# Patient Record
Sex: FEMALE | Race: WHITE
[De-identification: names, ages, dates, MRNs, and addresses within clinical notes are randomized per-mention and may not be internally consistent; named-entity substitution may affect disease eponyms.]

---

## 2018-06-12 ENCOUNTER — HOSPITAL ENCOUNTER (OUTPATIENT)
Dept: HOSPITAL 97 - OR | Age: 63
Discharge: HOME | End: 2018-06-12
Attending: OBSTETRICS & GYNECOLOGY
Payer: COMMERCIAL

## 2018-06-12 DIAGNOSIS — R93.8: ICD-10-CM

## 2018-06-12 DIAGNOSIS — I10: ICD-10-CM

## 2018-06-12 DIAGNOSIS — E78.2: ICD-10-CM

## 2018-06-12 DIAGNOSIS — Z82.62: ICD-10-CM

## 2018-06-12 DIAGNOSIS — Z80.3: ICD-10-CM

## 2018-06-12 DIAGNOSIS — F17.210: ICD-10-CM

## 2018-06-12 DIAGNOSIS — D25.9: Primary | ICD-10-CM

## 2018-06-12 PROCEDURE — 0UDB7ZX EXTRACTION OF ENDOMETRIUM, VIA NATURAL OR ARTIFICIAL OPENING, DIAGNOSTIC: ICD-10-PCS

## 2018-06-12 PROCEDURE — 88305 TISSUE EXAM BY PATHOLOGIST: CPT

## 2018-06-12 PROCEDURE — 0UJD8ZZ INSPECTION OF UTERUS AND CERVIX, VIA NATURAL OR ARTIFICIAL OPENING ENDOSCOPIC: ICD-10-PCS

## 2018-06-12 RX ADMIN — MORPHINE SULFATE ONE MG: 4 INJECTION, SOLUTION INTRAMUSCULAR; INTRAVENOUS at 10:00

## 2018-06-12 RX ADMIN — MORPHINE SULFATE ONE MG: 4 INJECTION, SOLUTION INTRAMUSCULAR; INTRAVENOUS at 09:53

## 2018-06-12 NOTE — XMS REPORT
Clinical Summary

 Created on:2018



Patient:Noni Jeong

Sex:Female

:1955

External Reference #:[id number]





Demographics







 Address  [address] [place] [address]

 

 Home Phone  [phone]

 

 Email Address  [email or web address]

 

 Preferred Language  English

 

 Marital Status  Unknown

 

 Scientologist Affiliation  Unknown

 

 Race  Unknown

 

 Ethnic Group  Unknown









Author







 Organization  [facility]

 

 Address  [address] [place] [address]









Support







 Name  Relationship  Address  Phone

 

 Noni Jeong  Unavailable  [address]  [phone]



     [place] [address]  









Care Team Providers







 Name  Role  Phone

 

 Asked, No Pcp  Primary Care Provider  Unavailable









Allergies

Not on File



Current Medications

No known medications



Active Problems







 Problem  Noted Date

 

 Primary osteoarthritis of right wrist  2016







Family History







 Medical History  Relation  Name  Comments

 

 Alcohol abuse  Father    

 

 Hypertension  Father    

 

 Arthritis  Mother    









 Relation  Name  Status  Comments

 

 Father      

 

 Mother      







Social History







 Tobacco Use  Types  Packs/Day  Years Used  Date

 

 Current Every Day Smoker  Cigarettes  1.5    









 Alcohol Use  Drinks/Week  oz/Week  Comments

 

 Yes  1-2 Standard drinks or equivalent  0.6 - 1.2  









 Sex Assigned at Birth  Date Recorded

 

 Not on file  







Last Filed Vital Signs

Not on file



Plan of Treatment







 Health Maintenance  Due Date  Last Done  Comments

 

 CERVICAL CANCER SCREENING  1978    

 

 BREAST CANCER SCREENING  2007    

 

 COLON CANCER SCREENING  2007    

 

 SHINGRIX VACCINE (#1)  2007    

 

 ZOSTER VACCINE  2017    

 

 INFLUENZA VACCINE  2018    







Results

Not on fileafter 2017



Insurance







 Payer  Benefit Plan / Group  Subscriber ID  Type  Phone  Address

 

 AETNA  AETNA PPO OPEN CHOICE  xxxxxxxxxx  PPO    









 Guarantor Name  Account Type  Relation to  Date of  Phone  Billing



     Patient  Birth    Address

 

 NONI JEONG  Personal/Family  Self  1955  Home:  [address] 







 [place]        [phone]  [place] [phone]

## 2018-07-09 NOTE — OP
Date of Procedure:  06/12/2018



Surgeon:  Danyell Francois MD



Preoperative Diagnosis:  Enlarging fibroid, thickened endometrium.



Postoperative Diagnosis:  Enlarging fibroid, thickened endometrium.



Procedure:  Hysteroscopy, dilation and curettage, and dissection of the endometrium with __________



Anesthesia:  General.



Complications:  No complications.



Drains:  No drains.



Estimated Blood Loss:  Minimal.



Specimens:  Endometrial curettings with a resection device.



Condition:  Stable.



Indications:  The patient is a 62-year-old with an enlarging fibroids on ultrasound, thickened endome
trium, needed resection of the endometrium for adequate sampling and so to rule out any endometrial a
typia or cancer, possibly also a biopsy of the myometrium.  She was properly consented and brought to
 the OR.  After consent was re-verified in the preop, she was taken back to the OR, placed in a supin
e fashion on the operating table.  After general with LMA was given, she was placed in dorsal lithoto
my position using Gregorio stirrups.  Pelvic exam was performed, confirmed enlarged uterus.  Speculum wa
s placed to expose the cervix, anterior lip was grasped with 2 Allis clamps.  Prep x3 with Betadine w
as done.  Using the hysteroscope that comes with __________ device hysteroscopy was performed.  Hyste
roscopy was performed through __________ sheath and normal saline was used for distention medium, a 0
 degree lens was used for this. 



Then after checking the inner lining of the uterus, both tubal ostia were visualized.  The entire cav
ity was visualized.  The cavity appeared to be impinged by the fibroid; however, no intracavitary les
ions were seen.  The endometrium appeared to be even and no irregularity was seen.  The resection dev
ice was inserted through this.  Then using the suction and the cutting, 360 degrees endometrial resec
tion, resectoscopic biopsy was taken by scraping of the lining from the fundus all the way to the int
ernal os on all sides.  After adequate sampling was done, there was excellent hemostasis.  The bipola
r at the tip of it was used to cauterize one area that was bleeding, but otherwise adequate sample wa
s obtained.  The patient was hemostatic after the scope was removed.  All instruments were removed.  
Instrument, needle, and sponge counts were done and were correct at the end of the case.  The patient
 tolerated the procedure well.  She was recovered from anesthesia and taken to the PACU in stable con
dition.





HORTENSIA

DD:  07/09/2018 08:42:30Voice ID:  [id number]

DT:  07/09/2018 19:13:21Report ID:  [id number]

## 2018-07-13 NOTE — OP
Date of Procedure:  06/12/2018



Surgeon:  Danyell Francois MD



Preoperative Diagnosis:  Enlarging fibroid, thickened endometrium.



Postoperative Diagnosis:  Enlarging fibroid, thickened endometrium.



Procedure:  Hysteroscopy, dilation and curettage, and dissection of the endometrium with Symphion.



Anesthesia:  General.



Complications:  No complications.



Drains:  No drains.



Estimated Blood Loss:  Minimal.



Specimens:  Endometrial curettings with a resection device.



Condition:  Stable.



Indications:  The patient is a 62-year-old with an enlarging fibroids on ultrasound, thickened endome
trium, needed resection of the endometrium for adequate sampling and so to rule out any endometrial a
typia or cancer, possibly also a biopsy of the myometrium.  She was properly consented and brought to
 the OR.  After consent was re-verified in the preop, she was taken back to the OR, placed in a supin
e fashion on the operating table.  After general with LMA was given, she was placed in dorsal lithoto
my position using Gregorio stirrups.  Pelvic exam was performed, confirmed enlarged uterus.  Speculum wa
s placed to expose the cervix, anterior lip was grasped with 2 Allis clamps.  Prep x3 with Betadine w
as done.  Using the hysteroscope that comes with Symphion device, hysteroscopy was performed.  Hyster
oscopy was performed through the Symphion sheath and normal saline was used for distention medium, a 
0 degree lens was used for this. 



Then after checking the inner lining of the uterus, both tubal ostia were visualized.  The entire cav
ity was visualized.  The cavity appeared to be impinged by the fibroid; however, no intracavitary les
ions were seen.  The endometrium appeared to be even and no irregularity was seen.  The resection dev
ice was inserted through this.  Then using the suction and the cutting, 360 degrees endometrial resec
tion, resectoscopic biopsy was taken by scraping of the lining from the fundus all the way to the int
ernal os on all sides.  After adequate sampling was done, there was excellent hemostasis.  The bipola
r at the tip of it was used to cauterize one area that was bleeding, but otherwise adequate sample wa
s obtained.  The patient was hemostatic after the scope was removed.  All instruments were removed.  
Instrument, needle, and sponge counts were done and were correct at the end of the case.  The patient
 tolerated the procedure well.  She was recovered from anesthesia and taken to the PACU in stable con
dition.





HORTENSIA

DD:  07/09/2018 08:42:30Voice ID:  [id number]

DT:  07/09/2018 19:13:21Report ID:  [id number]

## 2018-12-03 LAB
HCT VFR BLD CALC: 45 % (ref 36–45)
LYMPHOCYTES # SPEC AUTO: 2.1 K/UL (ref 0.7–4.9)
MCH RBC QN AUTO: 32 PG (ref 27–35)
MCV RBC: 93.9 FL (ref 80–100)
PMV BLD: 8.3 FL (ref 7.6–11.3)
RBC # BLD: 4.8 M/UL (ref 3.86–4.86)
UA DIPSTICK W REFLEX MICRO PNL UR: (no result)

## 2018-12-06 ENCOUNTER — HOSPITAL ENCOUNTER (OUTPATIENT)
Dept: HOSPITAL 97 - PRE | Age: 63
Discharge: HOME | End: 2018-12-06
Attending: OBSTETRICS & GYNECOLOGY
Payer: COMMERCIAL

## 2018-12-06 DIAGNOSIS — E55.9: ICD-10-CM

## 2018-12-06 DIAGNOSIS — Z79.899: ICD-10-CM

## 2018-12-06 DIAGNOSIS — F17.210: ICD-10-CM

## 2018-12-06 DIAGNOSIS — D25.9: Primary | ICD-10-CM

## 2018-12-06 DIAGNOSIS — F32.9: ICD-10-CM

## 2018-12-06 DIAGNOSIS — N85.8: ICD-10-CM

## 2018-12-06 DIAGNOSIS — E78.5: ICD-10-CM

## 2018-12-06 DIAGNOSIS — N83.201: ICD-10-CM

## 2018-12-06 DIAGNOSIS — M19.90: ICD-10-CM

## 2018-12-06 DIAGNOSIS — I10: ICD-10-CM

## 2018-12-06 DIAGNOSIS — N73.9: ICD-10-CM

## 2018-12-06 DIAGNOSIS — N83.202: ICD-10-CM

## 2018-12-06 DIAGNOSIS — K66.0: ICD-10-CM

## 2018-12-06 PROCEDURE — 85025 COMPLETE CBC W/AUTO DIFF WBC: CPT

## 2018-12-06 PROCEDURE — 88108 CYTOPATH CONCENTRATE TECH: CPT

## 2018-12-06 PROCEDURE — 36415 COLL VENOUS BLD VENIPUNCTURE: CPT

## 2018-12-06 PROCEDURE — 0UTC4ZZ RESECTION OF CERVIX, PERCUTANEOUS ENDOSCOPIC APPROACH: ICD-10-PCS

## 2018-12-06 PROCEDURE — 81003 URINALYSIS AUTO W/O SCOPE: CPT

## 2018-12-06 PROCEDURE — 86850 RBC ANTIBODY SCREEN: CPT

## 2018-12-06 PROCEDURE — 0UT7FZZ RESECTION OF BILATERAL FALLOPIAN TUBES, VIA NATURAL OR ARTIFICIAL OPENING WITH PERCUTANEOUS ENDOSCOPIC ASSISTANCE: ICD-10-PCS

## 2018-12-06 PROCEDURE — 0UT9FZZ RESECTION OF UTERUS, VIA NATURAL OR ARTIFICIAL OPENING WITH PERCUTANEOUS ENDOSCOPIC ASSISTANCE: ICD-10-PCS

## 2018-12-06 PROCEDURE — 0UT2FZZ RESECTION OF BILATERAL OVARIES, VIA NATURAL OR ARTIFICIAL OPENING WITH PERCUTANEOUS ENDOSCOPIC ASSISTANCE: ICD-10-PCS

## 2018-12-06 PROCEDURE — 88305 TISSUE EXAM BY PATHOLOGIST: CPT

## 2018-12-06 PROCEDURE — 88307 TISSUE EXAM BY PATHOLOGIST: CPT

## 2018-12-06 PROCEDURE — 86901 BLOOD TYPING SEROLOGIC RH(D): CPT

## 2018-12-06 PROCEDURE — 86900 BLOOD TYPING SEROLOGIC ABO: CPT

## 2018-12-06 RX ADMIN — MEPERIDINE HYDROCHLORIDE ONE MG: 50 INJECTION, SOLUTION INTRAMUSCULAR; INTRAVENOUS; SUBCUTANEOUS at 12:00

## 2018-12-06 RX ADMIN — MEPERIDINE HYDROCHLORIDE ONE MG: 50 INJECTION, SOLUTION INTRAMUSCULAR; INTRAVENOUS; SUBCUTANEOUS at 11:55

## 2018-12-06 RX ADMIN — MEPERIDINE HYDROCHLORIDE ONE MG: 50 INJECTION, SOLUTION INTRAMUSCULAR; INTRAVENOUS; SUBCUTANEOUS at 11:40

## 2018-12-06 RX ADMIN — SODIUM CHLORIDE ONE MLS: 0.9 INJECTION, SOLUTION INTRAVENOUS at 10:14

## 2018-12-06 RX ADMIN — SODIUM CHLORIDE ONE MLS: 0.9 INJECTION, SOLUTION INTRAVENOUS at 10:57

## 2018-12-06 RX ADMIN — MEPERIDINE HYDROCHLORIDE ONE MG: 50 INJECTION, SOLUTION INTRAMUSCULAR; INTRAVENOUS; SUBCUTANEOUS at 11:50

## 2018-12-08 NOTE — OP
Date of Procedure:  2018



Surgeon:  Danyell Francois MD



Assistant:  Chacha Amato.



Preoperative Diagnoses:  Leiomyoma, right lower quadrant pain, and bloating.



Postoperative Diagnoses:  Leiomyoma, right lower quadrant pain, and bloating, adhesions of the bladde
r and cecal adhesions.



Procedures Performed:  

1.Total laparoscopic hysterectomy, bilateral salpingo-oophorectomy, pelvic washings.

2.Removal of the specimen vaginally without a bag or morcellation and cystoscopy.



Estimated Blood Loss:  Minimal.



Specimens:  Uterus, bilateral tubes and ovaries, pelvic washings.



Complications:  No complications.



Drains:  No drains.



Condition:  Stable.



Findings:  There were cecal adhesions in the right lateral aspect of the umbilicus.  Upper abdominal 
surfaces, peritoneal surfaces and omentum all completely unremarkable.  The appendix appeared to be u
nremarkable as well.  The uterus had a small tumor on the anterior wall close to the fundus that was 
about 2.5 cm and a large posterior mass that appeared to be vascular and about 7-8 cm or maybe 8-9 cm
.  There were small fibroids along the bilateral round ligaments and there were moderate amount of ad
hesions at the level of her  scar.  Both ovaries were normal.  Cystoscopy negative.



Indications:  The patient is a 63-year-old who had pelvic mass that was evaluated with ultrasound cally
ples and no major change in the size over time.  However, on sampling, there were endometrial glands 
and stroma, smooth muscle suggestive of adenomyosis.  No evidence of any leiomyosarcoma or endometria
l atypia or malignancy.  So, discussed about the patient's symptoms as she was having right lower christina
drant pain.  She was concerned about the uterine mass.  We discussed together about the options of ob
servation with surveillance versus hysterectomy and bilateral salpingo-oophorectomy, this possibly be
ing a leiomyoma, leiomyosarcoma risk being very, very low, still present.  However, in the absence of
 postmenopausal bleeding, it is not a big concern, however, patient was concerned enough that due to 
the presence of the pain and the presence of the mass, she was uncomfortable to continue observation 
with ultrasound surveillance, so consented her for total laparoscopic hysterectomy, bilateral salping
o-oophorectomy, pelvic washings, and brought to the OR.



Description Of Procedure:  After 1 g of Ancef was given, she was taken to the OR.  SCDs were placed, 
placed in supine fashion on the operating table.  After general anesthesia was given, she was placed 
in dorsal lithotomy position using Gregorio stirrups.  Pelvic exam was performed.  Uterus was found to b
e enlarged with a posterior extension of the mass as was indicated on the exam. 



Abdomen, vulva, vagina, and perineum were prepped and draped in a sterile fashion.  Daniel was placed 
to drain the bladder and attached to cysto tubing for retrograde filling to an LR bag, emptied 300.  
A large VCare was inserted into the uterus and fixed in place. 



On the abdomen, an infraumbilical incision was made with a scalpel.  Fascia was incised, tagged.  Per
itoneum entered bluntly, S-retractors placed, Ethan introduced.  Site of entry was checked and unrem
arkable.  Upper abdominal surfaces, omentum, peritoneal surfaces were all unremarkable.  Appendix ashok
eared to be normal.  On the right lateral aspect of the umbilicus was a large cecal adhesion to the a
nterior abdominal wall.  The right lower quadrant, left lower quadrant, and suprapubic areas were all
 free.  5 mm left lower and right lower quadrant incisions and 10 mm suprapubic incisions were made a
nd trocars placed under direct vision without any problems. 



After patient was placed in steep Trendelenburg position, the bowel was packed up top, uterus was vis
ualized.  Both ureters were visualized from the pelvic brim to the ureteric tunnel and they were with
out any anatomic distortion. 



A 5 mm LigaSure was taken.  Before this, pelvic washings were obtained, making sure that the myoma an
teriorly and posteriorly were both evinced and specimen collected from adnexa as well in the abdomina
l cavity and the upper aspect as well. 



I went on to start the hysterectomy with using the 5 mm LigaSure, taking down the peritoneum below th
e level of the round ligament, opening the flap up, getting to the bladder flap.  Here, the bladder a
dhesions were well visualized and dissection was performed just underneath the peritoneum and bladder
 flap was raised all the way to the right round ligament.  The bladder was dissected at the level of 
the cup from the anterior vaginal wall,  the bladder with dissection with the LigaSure and 
inferiorly dissecting it, then opening up the vesicovaginal space with a monopolar hook blade.  The b
ladder was dissected inferiorly with the peanut as well as LigaSure.  Once below the level of the VCa
re cup, I went back to the round ligaments.  The round ligaments, mesosalpinx, utero-ovarian ligament
s were all taken down and the tube and the posterior broad ligament was dissected to the left uterosa
cral.  The broad ligament was taken down to skeletonize the vessels.  On the opposite side, similar d
issection was performed taking down the round ligament, then the mesosalpinx tube, utero-ovarian liga
ment, and posterior broad ligament all the way to the right uterosacral ligament.  The broad ligament
 was taken down.  The vessels were skeletonized.  Then, the dissection on this side was slightly more
 difficult due to the presence of the fibroid.  However, once I came down below the level of the fibr
oid, there were no problems, did not have to cut the fiber or touch it during the entire dissection a
nd once the vessels were exposed, a medial incision was made with the monopolar hook blade, then the 
vessels were taken down with the help of the bipolar basket tip followed by the LigaSure.  Then, the 
cardinal ligaments were also taken down on the right side, then going on to left side taking down the
 vessels and descending vaginal branch and the cardinal ligaments.  Once all the supports were taken 
down, a circumferential colpotomy was performed with a monopolar hook blade and the specimen was sepa
rated.  Then, I went down inferiorly and with the help of the Allis clamps, the cervix was grasped.  
The uterus was curetted out through the vagina.  Then, the fibroid was manipulated so that it was twi
sted and turned, it allowed the passage through the colpotomy of the fibroid.  There was some distent
ion of the vaginal canal for it to be fit, but once I was able to slip it out, the entire specimen ca
me out.  There was slight separation of the myoma from the uterus with just traction applied on the s
pecimen to pull it out, but did not have to grasp it other than with a single-tooth tenaculum after I
 visualized it at the level of the vagina and it was removed gently without getting a spill from the 
gas in the abdominal cavity through the vaginal canal. 



Vaginal occluder bulb was placed.  Thorough irrigation and suction were performed in the abdominal ca
vity.  Then, tubes and ovaries were taken down, taken down infundibulopelvic ligament, broad ligament
, and the mesosalpinx, completely taking out the tube on both sides, first on the left, then on the r
ight.  I was able to make sure that all the tumors were removed from the round ligament as well.  The
n, at the level of the bladder flap, the irrigation and suction were performed.  All the specimens we
re placed in an Endo Catch bag placed through the vagina and retrieved without any problems.  Then, w
ent on to close the vaginal cuff with the help of 0 PDS 2 angle stitches and 2 figure-of-eight from t
he top and then at center, simple stitch. 



Cystoscopy was performed to make sure there was no bladder injury before the closure was done.  First
, the trocars were all removed after suction and irrigation were done in the peritoneal cavity.  Uret
ers had no evidence of electrical, mechanical, or thermal injury.  The gas was desufflated and trocar
s removed.  Fascia was closed at the umbilicus with a figure-of-eight and then simple 0 Vicryl stitch
 at the suprapubic fascial incision.  All skin incisions were closed with the help of 4-0 Monocryl in
terrupted sutures.  Once cystoscopy was performed, there was no evidence of any trauma to the bladder
.  No tumors in the bladder.  The entire bladder was examined carefully due to the history of smoking
 for this patient and lower urinary tract symptoms and urgency.  Ureters were patent as well.  The bl
adder was drained and scope removed.  On examining the vaginal cuff here with the help of scope doing
 a vaginoscopy, there was an opening in the center of the cuff that allowed peritoneal fluid to leak 
out.  At this point, I thought that this would be better to be repaired, this was because of the spac
e between the 2 figure-of-eight where I put a simple stitch, so posterior colpotomy was more exposed 
and not completely closed.  So, a 0 Vicryl suture was taken on a CT-1 needle and carefully the poster
ior and anterior colpotomy sutures were taken through them.  Two figure-of-eight were placed and comp
lete closure was obtained.  There was excellent hemostasis.  No concern for any bowel injury.  All th
e instruments were removed.  Instrument, needle, and sponge counts were done and were correct at the 
end of the case.  The patient tolerated the procedure well.  She was extubated in the OR 

and taken to PACU in stable condition.  She will follow up with me in 1 week.





HORTENSIA

DD:  2018 20:38:36Voice ID:  [id number]

DT:  2018 02:33:05Report ID:  [id number]

## 2020-02-24 NOTE — XMS REPORT
Clinical Summary

 Created on:2018



Patient:Anh Scott

Sex:Female

:1955

External Reference #:[id number]





Demographics







 Address  [address] [place] [address]

 

 Home Phone  [phone]

 

 Email Address  [email or web address]

 

 Preferred Language  English

 

 Marital Status  Unknown

 

 Restorationism Affiliation  Unknown

 

 Race  Unknown

 

 Ethnic Group  Unknown









Author







 Organization  [facility]

 

 Address  [address] [place] [address]









Support







 Name  Relationship  Address  Phone

 

 Anh Scott  Unavailable  [address]  [phone]



     [place] [address]  









Care Team Providers







 Name  Role  Phone

 

 Asked, No Pcp  Primary Care Provider  Unavailable









Allergies

Not on File



Medications

No known medications



Active Problems







 Problem  Noted Date

 

 Primary osteoarthritis of right wrist  2016







Family History







 Medical History  Relation  Name  Comments

 

 Alcohol abuse  Father    

 

 Hypertension  Father    

 

 Arthritis  Mother    









 Relation  Name  Status  Comments

 

 Father      

 

 Mother      







Social History







 Tobacco Use  Types  Packs/Day  Years Used  Date

 

 Current Every Day Smoker  Cigarettes  1.5    









 Alcohol Use  Drinks/Week  oz/Week  Comments

 

 Yes  1-2 Standard drinks or equivalent  0.6 - 1.2  









 Sex Assigned at Birth  Date Recorded

 

 Not on file  









 Job Start Date  Occupation  Industry

 

 Not on file  Not on file  Not on file









 Travel History  Travel Start  Travel End









 No recent travel history available.







Last Filed Vital Signs

Not on file



Plan of Treatment







 Health Maintenance  Due Date  Last Done  Comments

 

 CERVICAL CANCER SCREENING  1978    

 

 BREAST CANCER SCREENING  2007    

 

 COLON CANCER SCREENING  2007    

 

 SHINGRIX VACCINE (1 of 2)  2007    

 

 ZOSTER VACCINE  2017    

 

 INFLUENZA VACCINE  2018    







Results

Not on fileafter 2017



Insurance







 Payer  Benefit Plan / Group  Subscriber ID  Type  Phone  Address

 

 AETNA  AETNA PPO OPEN CHOICE  xxxxxxxxxx  PPO    









 Guarantor Name  Account Type  Relation to  Date of  Phone  Billing



     Patient  Birth    Address

 

 Anh Scott  Personal/Family  Self  1955  [phone]  [address] [place]        (Home)  [place]



           [id number]







Advance Directives

Patient has advance care planning documents on file. For more information, 
please contact:[address] [address]
Statement Selected